# Patient Record
(demographics unavailable — no encounter records)

---

## 2017-08-19 NOTE — EDPHY
H & P


Time Seen by Provider: 08/19/17 14:01


HPI/ROS: 





CHIEF COMPLAINT:  Left upper eyelid swelling





HISTORY OF PRESENT ILLNESS:  Patient developed a left eyelid stye 48 hours ago 

and was started on Bactrim yesterday at urgent care.  She presents today with 

continued left eyelid swelling, mild discomfort, some redness laterally, some 

clear yellow drainage.





REVIEW OF SYSTEMS:  No headache.





PAST MEDICAL HISTORY:  Negative





Social history:  Teacher





General Appearance: Alert, no distress.


Visual acuity: noted from nursing notes. Normal.


Lids and Lashes:  Left lid edema especially laterally, lateral stye 3 mm.


Conjunctivae:  Some yellow drainage, mildly inflamed.


Sclera:  Not icteric, left lateral subconjunctival hemorrhage.


Pupils: Equal and round, normally reactive.


Corneas:  No foreign body on surface of cornea.  No chemosis.


Anterior chamber: normal, no hyphema or hypopyon.


External: No proptosis, no periorbital swelling or redness or tenderness. She 

does have left eyelid swelling.  Extraocular motion intact.





Emergency Department course/MDM:


Maureen 1422; recommends discontinue Bactrim, placed on doxycycline, 

ophthalmology follow-up on Monday if not better.


Smoking Status: Never smoked


Constitutional: 


 Initial Vital Signs











Temperature (C)  36.6 C   08/19/17 12:55


 


Heart Rate  60   08/19/17 12:55


 


Respiratory Rate  12   08/19/17 12:55


 


Blood Pressure  106/65   08/19/17 12:55


 


O2 Sat (%)  96   08/19/17 12:55








 











O2 Delivery Mode               Room Air














Allergies/Adverse Reactions: 


 





ampicillin Allergy (Verified 08/19/17 12:57)


 


azithromycin Allergy (Verified 08/19/17 12:58)


 


Penicillins Allergy (Verified 08/19/17 12:57)


 








Home Medications: 














 Medication  Instructions  Recorded


 


Doxycycline Hyclate 100 mg PO BID #20 tab 08/19/17














MDM/Departure





- Depart


Disposition: Home, Routine, Self-Care


Clinical Impression: 


Stye


Qualifiers:


 Laterality: left Eyelid: upper Qualified Code(s): H00.014 - Hordeolum externum 

left upper eyelid





Condition: Good


Instructions:  Stye (ED)


Additional Instructions: 


1) stop bactrim


2) warm compresses every hour while awake, return for increasing pain or 

swelling, or any trouble with vision


Prescriptions: 


Doxycycline Hyclate 100 mg PO BID #20 tab


Referrals: 


Salazar Reddy MD [Medical Doctor] - 08/21/17